# Patient Record
(demographics unavailable — no encounter records)

---

## 2024-10-10 NOTE — HISTORY OF PRESENT ILLNESS
[FreeTextEntry1] : This is a 58 y/o female with a pmhx of anxiety, breast CA, osteoporosis here today for a follow up.  Patient was seen 8/16/24 by Dr. Kraft, effexor dose increased. Patient does admits to stress due to illness of father. Patient reports elevated BP at MD visits.  Patient reports feeling run down and with chills.  Patient denies chest pain, dyspnea, palpitations, dizziness, syncope, changes in bowel/bladder habits or appetite.

## 2024-11-14 NOTE — HISTORY OF PRESENT ILLNESS
[FreeTextEntry1] : BEKAH is a 57 year F w/MHx of Breast Ca, Anxiety who presents for follow up. Last OV 10/10/2024. Since last visit was started on Diltiazem, no ADR. Did have abnormal ECG. 10/21/2024 Exercise Treadmill Stress Test with positive ECG changes, 10/21/2024 Nuclear Treadmill Stress Test Normal myocardial perfusion scan, with no evidence of infarction or inducible ischemia, ECG changes noted. 10/31/2024 CT Heart Calcium score Agatston 0. Did have c/o chills, s/p Macrobid, reports resolution. Denies chest pain, palpitations, diaphoresis, vision changes, HA, dizziness, syncope, cough, wheezing, SOB/SRIVASTAVA, edema, fatigue, fever, chills, infection/UTI SXS.

## 2024-11-25 NOTE — ASSESSMENT
[FreeTextEntry1] : 57-year-old female who presents for microhematuria and episodic LUTS  Discussed with patient that given the high number of RBCs on her urinalysis, she is considered high risk for the AUA guidelines and we should proceed with CT urogram and cystoscopy.  She recently unfortunately was exposed to much radiation with other CT scans and so she feels more comfortable proceeding with an ultrasound at this time.  Discussed that if there are any abnormalities, we would obtain a CT urogram.  We also send her urine again for urinalysis and urine culture  Plan for renal bladder ultrasound and return to office for cystoscopy with pelvic exam Will call the patient with the results of the urine studies

## 2024-11-25 NOTE — REASON FOR VISIT
[TextEntry] : 57-year-old female who presents for microhematuria and episodic LUTS  Patient with a history of microscopic hematuria.  She was seen in 4/2022 and advised to undergo renal bladder ultrasound cystoscopy.  She had a negative urine cytology and UA showed 15 RBCs.  She never underwent the workup.  She reports that she had another episode of microscopic hematuria.  She denies gross hematuria, smoking history, chemical exposure.  She also reports 2 episodes of dysuria, frequency, urgency, vaginal discharge where she assumes she had a UTI but urine cultures were negative.  This happened most recently in October.  She was given antibiotics and her symptoms improved. Patient denies any inciting factor for these episodes.  She denies flank pain, intercourse, change in bowels.  At baseline she voids hourly, has nocturia x 0, endorses occasional urgency but denies incontinence.  She denies straining with bladder emptying.  She denies prior urologic history.  She is a history of 2 vaginal deliveries.  She was diagnosed with breast cancer in 12/2013 and underwent bilateral mastectomy.  She was treated with chemo and tamoxifen.  She was found to be positive for the BRCA gene.  She then underwent laparoscopic bilateral salpingo oophorectomy in 2014.  She denies vaginal bulge or significant dryness but endorses dyspareunia.  She is been using Revaree for the symptoms.  She has regular bowels. She denies diabetes She denies other health issues  UA 2 RBCs 11/2024 Urine culture negative UA 51 RBC, 12 WBC 10/2024 Urine culture negative Creatinine 0.59 A1c 5.3%

## 2025-01-16 NOTE — PHYSICAL EXAM
[Chaperone Present] : A chaperone was present in the examining room during all aspects of the physical examination [67763] : A chaperone was present during the pelvic exam. [Absent] : Adnexa(ae): Absent [Normal] : Anus and perineum: Normal sphincter tone, no masses, no prolapse. [Fully active, able to carry on all pre-disease performance without restriction] : Status 0 - Fully active, able to carry on all pre-disease performance without restriction [FreeTextEntry2] : Niru Mcdaniel

## 2025-01-16 NOTE — OB HISTORY
[Total Preg ___] : : [unfilled] [Full Term ___] : [unfilled] (full-term) [Vaginal ___] : [unfilled] vaginal delivery(s) [Menopause  Age ____] : menopause occurred at age [unfilled]

## 2025-01-16 NOTE — PHYSICAL EXAM
[Chaperone Present] : A chaperone was present in the examining room during all aspects of the physical examination [52770] : A chaperone was present during the pelvic exam. [Absent] : Adnexa(ae): Absent [Normal] : Anus and perineum: Normal sphincter tone, no masses, no prolapse. [Fully active, able to carry on all pre-disease performance without restriction] : Status 0 - Fully active, able to carry on all pre-disease performance without restriction [FreeTextEntry2] : Niru Mcdaniel

## 2025-01-16 NOTE — HISTORY OF PRESENT ILLNESS
[FreeTextEntry1] : 58 y/o  female with hx of  breast cancer and BRCA 1 deleterious mutation here for evaluation.  She is s/p left lumpectomy with SLN in 2015. She completed 4 cycles of Taxol/Cyclophosphamide.  On 2015, patient underwent a LSC BSO and bilateral mastectomy with immediate reconstruction with tissue expanders in a combined case with Dr. Cole and Dr. Russo.  Final pathology showed benign tubes and ovaries.  She is presently taking tamoxifen for prevention of breast cancer recurrence.  A TVUS on 18 showed the endometrium was heterogenous an difficult to visualize, however possibly a 11 mm EMS with cystic changes.  No mass or vascularity noted.  EMBx was insufficient and D&C/hysteroscopy with submucosal resection of a fibroid was done 2018.  Pathology was benign with weakly proliferative endometrium.  Subsequently she had a ASCUS (+) [facility]HPV pap smear and negative colposcopy in 2018.  Follow up pap smears have been negative HRHPV.    2023- Pap- negative  2024- - 10 2024- TVUS- Uterine morphologic features c/w sequela of tamoxifen therapy, limiting evaluation of endometrium although without sonographic vascular intracavitary process.  No fluid noted  2024- EMBx- scant inactive to atrophic endometrium   She is on Prolia for osteoporosis   She had a recent CT Urogram in 2025 for microscopic hematuria with Urologist, Dr. Mclain. Urine cx was negative. It showed unchanged liver hemangiomas, 1 1.3 cm splenic cyst, and a subcentimeter hypodense renal lesion that is too small to characterize in the midpole of the right kidney. There is a 2 mm nonobstructing calculus in the upper pole the right kidney. Normal bladder and uterus.   Pt reports normal Pap 6 months ago. Last colonoscopy was normal 2.5 years ago.

## 2025-01-16 NOTE — HISTORY OF PRESENT ILLNESS
[FreeTextEntry1] : 58 y/o  female with hx of  breast cancer and BRCA 1 deleterious mutation here for evaluation.  She is s/p left lumpectomy with SLN in 2015. She completed 4 cycles of Taxol/Cyclophosphamide.  On 2015, patient underwent a LSC BSO and bilateral mastectomy with immediate reconstruction with tissue expanders in a combined case with Dr. oCle and Dr. Russo.  Final pathology showed benign tubes and ovaries.  She is presently taking tamoxifen for prevention of breast cancer recurrence.  A TVUS on 18 showed the endometrium was heterogenous an difficult to visualize, however possibly a 11 mm EMS with cystic changes.  No mass or vascularity noted.  EMBx was insufficient and D&C/hysteroscopy with submucosal resection of a fibroid was done 2018.  Pathology was benign with weakly proliferative endometrium.  Subsequently she had a ASCUS (+) HRHPV pap smear and negative colposcopy in 2018.  Follow up pap smears have been negative HRHPV.    2023- Pap- negative  2024- - 10 2024- TVUS- Uterine morphologic features c/w sequela of tamoxifen therapy, limiting evaluation of endometrium although without sonographic vascular intracavitary process.  No fluid noted  2024- EMBx- scant inactive to atrophic endometrium   She is on Prolia for osteoporosis   She had a recent CT Urogram in 2025 for microscopic hematuria with Urologist, Dr. Mclain. Urine cx was negative. It showed unchanged liver hemangiomas, 1 1.3 cm splenic cyst, and a subcentimeter hypodense renal lesion that is too small to characterize in the midpole of the right kidney. There is a 2 mm nonobstructing calculus in the upper pole the right kidney. Normal bladder and uterus.   Pt reports normal Pap 6 months ago. Last colonoscopy was normal 2.5 years ago.

## 2025-01-16 NOTE — ASSESSMENT
[FreeTextEntry1] : 57 year old woman with Hx of BRCA 1 mutation and breast cancer s/p lumpectomy/RT and prophylactic BSO. She remains on tamoxifen for breast ca recurrence prevention and reports no vaginal bleeding or abnl dc. Normal pelvic xam. No family hx of pancreatic cancer. Hx of HPV infection with normal pap   1. Repeat  2. Repeat Pap 3. RTC in 6 months or sooner if sx's occur

## 2025-02-10 NOTE — HISTORY OF PRESENT ILLNESS
[FreeTextEntry8] : 57 year old female presents with complaints itching sensation in her neck and back that has been ongoing for months and she is following with a dermatologist.  She reports she stopped taking cardizem 2 days ago and the itching sensation has slightly improved.  Currently she denies any chest pain, shortness of breath or cough.

## 2025-02-20 NOTE — PLAN
[FreeTextEntry1] : HTN - pt resumed cardizem 120 four days ago and is doing well.  c/w same Hx of breast CA - c/w tamoxifen, f/u with oncology Hx of dizziness - resolved with meclizine Skin itching - will refer to allergist

## 2025-02-20 NOTE — REVIEW OF SYSTEMS
[Dizziness] : dizziness [Fever] : no fever [Night Sweats] : no night sweats [Discharge] : no discharge [Vision Problems] : no vision problems [Earache] : no earache [Nasal Discharge] : no nasal discharge [Chest Pain] : no chest pain [Orthopnea] : no orthopnea [Shortness Of Breath] : no shortness of breath [Abdominal Pain] : no abdominal pain [Vomiting] : no vomiting [Dysuria] : no dysuria [Hematuria] : no hematuria [Joint Pain] : no joint pain [Muscle Pain] : no muscle pain [Itching] : no itching [Skin Rash] : no skin rash [Headache] : no headache [Memory Loss] : no memory loss [Suicidal] : not suicidal [Easy Bleeding] : no easy bleeding

## 2025-02-20 NOTE — HISTORY OF PRESENT ILLNESS
[FreeTextEntry1] : Follow-up, HTN [de-identified] : 57 year old female here for a f/u visit.  Last weekend, patient had complaints of dizziness, went to urgent care, was prescribed meclizine with resolution of symptoms.  She took 1 tablet of meclizine and symptoms resolved.  She also resumed her prior dose of diltiazem as itching did not subside after holding it a few days.  Currently she denies any medical complaints. She denies any chest pain, shortness of breath or cough.

## 2025-05-30 NOTE — HISTORY OF PRESENT ILLNESS
[FreeTextEntry1] : BEKAH is a 57 year F w/MHx of Breast Ca, Anxiety who presents for follow up. Seen by Dr. Salgado in 02/2025 with itching sensation in neck and back, which did not improve after stopping diltiazem for couple of days, so she resumed diltiazem. She still reports of flare up of itching and hives that comes and goes, pending to see derm today. She was seen at urgent care with dizziness, resolved after 1 dose of meclizine. Denies chest pain, palpitations, diaphoresis, vision changes, HA, dizziness, syncope, cough, wheezing, SOB/SRIVASTAVA, edema, fever, chills, infection.

## 2025-07-17 NOTE — HISTORY OF PRESENT ILLNESS
[FreeTextEntry1] : 56 y/o  female with hx of  breast cancer and BRCA 1 deleterious mutation here for evaluation.  She is s/p left lumpectomy with SLN in 2015. She completed 4 cycles of Taxol/Cyclophosphamide.  On 2015, patient underwent a LSC BSO and bilateral mastectomy with immediate reconstruction with tissue expanders in a combined case with Dr. Cole and Dr. Russo.  Final pathology showed benign tubes and ovaries.  She is presently taking tamoxifen for prevention of breast cancer recurrence.  A TVUS on 18 showed the endometrium was heterogenous an difficult to visualize, however possibly a 11 mm EMS with cystic changes.  No mass or vascularity noted.  EMBx was insufficient and D&C/hysteroscopy with submucosal resection of a fibroid was done 2018.  Pathology was benign with weakly proliferative endometrium.  Subsequently she had a ASCUS (+) HRHPV pap smear and negative colposcopy in 2018.  Follow up pap smears have been negative HRHPV.    2023- Pap- negative  2024- - 10 2024- TVUS- Uterine morphologic features c/w sequela of tamoxifen therapy, limiting evaluation of endometrium although without sonographic vascular intracavitary process.  No fluid noted  2024- EMBx- scant inactive to atrophic endometrium   She had a recent CT Urogram in 2025 for microscopic hematuria with Urologist, Dr. Mclain. Urine cx was negative. It showed unchanged liver hemangiomas, 1 1.3 cm splenic cyst, and a subcentimeter hypodense renal lesion that is too small to characterize in the midpole of the right kidney. There is a 2 mm nonobstructing calculus in the upper pole the right kidney. Normal bladder and uterus.   Last Pap 1 year ago wnl, requests repeat. Last colonoscopy was normal 2.5 years ago. She had a nuclear stress test performed on 10/21/24 secondary to having a 1 mm horizontal depression in the lateral leads on her exercise stress test that was performed for an evaluation of chest pain. Continues on tamoxifen for breast cancer at 10mg PO QD. No vaginal bleeding or abnl dc. No pelvic or abd pain. No change in bowel/urinary habits. UA 2025 shows persistent hematuria with negative urine cx.    English

## 2025-07-17 NOTE — ASSESSMENT
[FreeTextEntry1] : 58 year old woman with hx fo breast cancer and BRCA 1 mutation. She is s/p prophylactic BSO and bilateral mastectomy. She remains in clinical remission from breast cancer and takes tamoxifen. Her physical exam is normal and she offers no worrisome complaints. Hx of ASCUS/ HR HPV pos Pap  1. Repeat Pap 2. Repeat   3. RTC in 6 months or sooner if sxs occur

## 2025-07-17 NOTE — PHYSICAL EXAM
[Absent] : Adnexa(ae): Absent [Normal] : Anus and perineum: Normal sphincter tone, no masses, no prolapse. [Fully active, able to carry on all pre-disease performance without restriction] : Status 0 - Fully active, able to carry on all pre-disease performance without restriction [MA] : MA [FreeTextEntry2] : Tina